# Patient Record
Sex: MALE | Race: WHITE | NOT HISPANIC OR LATINO | Employment: OTHER | URBAN - METROPOLITAN AREA
[De-identification: names, ages, dates, MRNs, and addresses within clinical notes are randomized per-mention and may not be internally consistent; named-entity substitution may affect disease eponyms.]

---

## 2019-01-13 ENCOUNTER — APPOINTMENT (OUTPATIENT)
Dept: RADIOLOGY | Facility: MEDICAL CENTER | Age: 84
End: 2019-01-13
Payer: MEDICARE

## 2019-01-13 ENCOUNTER — APPOINTMENT (OUTPATIENT)
Dept: RADIOLOGY | Facility: MEDICAL CENTER | Age: 84
End: 2019-01-13
Attending: EMERGENCY MEDICINE
Payer: MEDICARE

## 2019-01-13 ENCOUNTER — HOSPITAL ENCOUNTER (OUTPATIENT)
Facility: MEDICAL CENTER | Age: 84
End: 2019-01-14
Attending: EMERGENCY MEDICINE | Admitting: HOSPITALIST
Payer: MEDICARE

## 2019-01-13 DIAGNOSIS — S42.202A CLOSED FRACTURE OF PROXIMAL END OF LEFT HUMERUS, UNSPECIFIED FRACTURE MORPHOLOGY, INITIAL ENCOUNTER: ICD-10-CM

## 2019-01-13 DIAGNOSIS — S02.30XA CLOSED FRACTURE OF ORBITAL FLOOR, UNSPECIFIED LATERALITY, INITIAL ENCOUNTER (HCC): ICD-10-CM

## 2019-01-13 DIAGNOSIS — S09.90XA CLOSED HEAD INJURY, INITIAL ENCOUNTER: ICD-10-CM

## 2019-01-13 DIAGNOSIS — S01.81XA FACIAL LACERATION, INITIAL ENCOUNTER: ICD-10-CM

## 2019-01-13 PROBLEM — S42.202D CLOSED FRACTURE OF PROXIMAL END OF LEFT HUMERUS WITH ROUTINE HEALING: Status: ACTIVE | Noted: 2019-01-13

## 2019-01-13 PROBLEM — E66.3 OVERWEIGHT (BMI 25.0-29.9): Status: ACTIVE | Noted: 2019-01-13

## 2019-01-13 PROBLEM — E78.5 DYSLIPIDEMIA: Status: ACTIVE | Noted: 2019-01-13

## 2019-01-13 PROBLEM — S02.85XD CLOSED FRACTURE OF ORBIT WITH ROUTINE HEALING: Status: ACTIVE | Noted: 2019-01-13
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
## 2019-01-13 LAB
ABO GROUP BLD: NORMAL
ALBUMIN SERPL BCP-MCNC: 4.2 G/DL (ref 3.2–4.9)
ALBUMIN/GLOB SERPL: 1.3 G/DL
ALP SERPL-CCNC: 84 U/L (ref 30–99)
ALT SERPL-CCNC: 15 U/L (ref 2–50)
ANION GAP SERPL CALC-SCNC: 8 MMOL/L (ref 0–11.9)
APTT PPP: 28 SEC (ref 24.7–36)
AST SERPL-CCNC: 18 U/L (ref 12–45)
BILIRUB SERPL-MCNC: 0.5 MG/DL (ref 0.1–1.5)
BLD GP AB SCN SERPL QL: NORMAL
BUN SERPL-MCNC: 19 MG/DL (ref 8–22)
CALCIUM SERPL-MCNC: 9.4 MG/DL (ref 8.5–10.5)
CFT BLD TEG: 5.4 MIN (ref 5–10)
CHLORIDE SERPL-SCNC: 107 MMOL/L (ref 96–112)
CLOT ANGLE BLD TEG: 64.3 DEGREES (ref 53–72)
CLOT LYSIS 30M P MA LENFR BLD TEG: 0.9 % (ref 0–8)
CO2 SERPL-SCNC: 25 MMOL/L (ref 20–33)
CREAT SERPL-MCNC: 1.27 MG/DL (ref 0.5–1.4)
CT.EXTRINSIC BLD ROTEM: 1.8 MIN (ref 1–3)
ERYTHROCYTE [DISTWIDTH] IN BLOOD BY AUTOMATED COUNT: 42.6 FL (ref 35.9–50)
ETHANOL BLD-MCNC: 0 G/DL
GLOBULIN SER CALC-MCNC: 3.2 G/DL (ref 1.9–3.5)
GLUCOSE SERPL-MCNC: 165 MG/DL (ref 65–99)
HCT VFR BLD AUTO: 42.6 % (ref 42–52)
HGB BLD-MCNC: 14.6 G/DL (ref 14–18)
INR PPP: 0.99 (ref 0.87–1.13)
MCF BLD TEG: 61.6 MM (ref 50–70)
MCH RBC QN AUTO: 33.3 PG (ref 27–33)
MCHC RBC AUTO-ENTMCNC: 34.3 G/DL (ref 33.7–35.3)
MCV RBC AUTO: 97 FL (ref 81.4–97.8)
PA AA BLD-ACNC: 32 %
PA ADP BLD-ACNC: 0 %
PLATELET # BLD AUTO: 220 K/UL (ref 164–446)
PMV BLD AUTO: 10 FL (ref 9–12.9)
POTASSIUM SERPL-SCNC: 3.9 MMOL/L (ref 3.6–5.5)
PROT SERPL-MCNC: 7.4 G/DL (ref 6–8.2)
PROTHROMBIN TIME: 13.2 SEC (ref 12–14.6)
RBC # BLD AUTO: 4.39 M/UL (ref 4.7–6.1)
RH BLD: NORMAL
SODIUM SERPL-SCNC: 140 MMOL/L (ref 135–145)
TEG ALGORITHM TGALG: NORMAL
WBC # BLD AUTO: 11.8 K/UL (ref 4.8–10.8)

## 2019-01-13 PROCEDURE — 85730 THROMBOPLASTIN TIME PARTIAL: CPT

## 2019-01-13 PROCEDURE — 99220 PR INITIAL OBSERVATION CARE,LEVL III: CPT | Performed by: HOSPITALIST

## 2019-01-13 PROCEDURE — 85347 COAGULATION TIME ACTIVATED: CPT

## 2019-01-13 PROCEDURE — 86850 RBC ANTIBODY SCREEN: CPT

## 2019-01-13 PROCEDURE — 70450 CT HEAD/BRAIN W/O DYE: CPT

## 2019-01-13 PROCEDURE — 85610 PROTHROMBIN TIME: CPT

## 2019-01-13 PROCEDURE — 73070 X-RAY EXAM OF ELBOW: CPT | Mod: LT

## 2019-01-13 PROCEDURE — 303353 HCHG DERMABOND SKIN ADHESIVE

## 2019-01-13 PROCEDURE — 73030 X-RAY EXAM OF SHOULDER: CPT | Mod: LT

## 2019-01-13 PROCEDURE — 86900 BLOOD TYPING SEROLOGIC ABO: CPT

## 2019-01-13 PROCEDURE — 304999 HCHG REPAIR-SIMPLE/INTERMED LEVEL 1

## 2019-01-13 PROCEDURE — G0378 HOSPITAL OBSERVATION PER HR: HCPCS

## 2019-01-13 PROCEDURE — 304217 HCHG IRRIGATION SYSTEM

## 2019-01-13 PROCEDURE — 70486 CT MAXILLOFACIAL W/O DYE: CPT

## 2019-01-13 PROCEDURE — 85576 BLOOD PLATELET AGGREGATION: CPT | Mod: 91

## 2019-01-13 PROCEDURE — 71045 X-RAY EXAM CHEST 1 VIEW: CPT

## 2019-01-13 PROCEDURE — 72125 CT NECK SPINE W/O DYE: CPT

## 2019-01-13 PROCEDURE — 305948 HCHG GREEN TRAUMA ACT PRE-NOTIFY NO CC

## 2019-01-13 PROCEDURE — 700105 HCHG RX REV CODE 258: Performed by: HOSPITALIST

## 2019-01-13 PROCEDURE — 80307 DRUG TEST PRSMV CHEM ANLYZR: CPT

## 2019-01-13 PROCEDURE — 86901 BLOOD TYPING SEROLOGIC RH(D): CPT

## 2019-01-13 PROCEDURE — 80053 COMPREHEN METABOLIC PANEL: CPT

## 2019-01-13 PROCEDURE — 99285 EMERGENCY DEPT VISIT HI MDM: CPT

## 2019-01-13 PROCEDURE — 85384 FIBRINOGEN ACTIVITY: CPT

## 2019-01-13 PROCEDURE — 85027 COMPLETE CBC AUTOMATED: CPT

## 2019-01-13 RX ORDER — SODIUM CHLORIDE 9 MG/ML
INJECTION, SOLUTION INTRAVENOUS CONTINUOUS
Status: DISCONTINUED | OUTPATIENT
Start: 2019-01-13 | End: 2019-01-14 | Stop reason: HOSPADM

## 2019-01-13 RX ORDER — ACETAMINOPHEN 325 MG/1
650 TABLET ORAL EVERY 6 HOURS PRN
Status: DISCONTINUED | OUTPATIENT
Start: 2019-01-13 | End: 2019-01-14 | Stop reason: HOSPADM

## 2019-01-13 RX ORDER — MORPHINE SULFATE 4 MG/ML
2 INJECTION, SOLUTION INTRAMUSCULAR; INTRAVENOUS
Status: DISCONTINUED | OUTPATIENT
Start: 2019-01-13 | End: 2019-01-14 | Stop reason: HOSPADM

## 2019-01-13 RX ORDER — POLYETHYLENE GLYCOL 3350 17 G/17G
1 POWDER, FOR SOLUTION ORAL
Status: DISCONTINUED | OUTPATIENT
Start: 2019-01-13 | End: 2019-01-14 | Stop reason: HOSPADM

## 2019-01-13 RX ORDER — ATORVASTATIN CALCIUM 10 MG/1
10 TABLET, FILM COATED ORAL
COMMUNITY

## 2019-01-13 RX ORDER — AMOXICILLIN 250 MG
2 CAPSULE ORAL 2 TIMES DAILY
Status: DISCONTINUED | OUTPATIENT
Start: 2019-01-13 | End: 2019-01-14 | Stop reason: HOSPADM

## 2019-01-13 RX ORDER — ATORVASTATIN CALCIUM 10 MG/1
10 TABLET, FILM COATED ORAL
Status: DISCONTINUED | OUTPATIENT
Start: 2019-01-13 | End: 2019-01-14 | Stop reason: HOSPADM

## 2019-01-13 RX ORDER — ONDANSETRON 2 MG/ML
4 INJECTION INTRAMUSCULAR; INTRAVENOUS EVERY 4 HOURS PRN
Status: DISCONTINUED | OUTPATIENT
Start: 2019-01-13 | End: 2019-01-14 | Stop reason: HOSPADM

## 2019-01-13 RX ORDER — OXYCODONE HYDROCHLORIDE 5 MG/1
5 TABLET ORAL
Status: DISCONTINUED | OUTPATIENT
Start: 2019-01-13 | End: 2019-01-14 | Stop reason: HOSPADM

## 2019-01-13 RX ORDER — BISACODYL 10 MG
10 SUPPOSITORY, RECTAL RECTAL
Status: DISCONTINUED | OUTPATIENT
Start: 2019-01-13 | End: 2019-01-14 | Stop reason: HOSPADM

## 2019-01-13 RX ORDER — ONDANSETRON 4 MG/1
4 TABLET, ORALLY DISINTEGRATING ORAL EVERY 4 HOURS PRN
Status: DISCONTINUED | OUTPATIENT
Start: 2019-01-13 | End: 2019-01-14 | Stop reason: HOSPADM

## 2019-01-13 RX ORDER — OXYCODONE HYDROCHLORIDE 5 MG/1
2.5 TABLET ORAL
Status: DISCONTINUED | OUTPATIENT
Start: 2019-01-13 | End: 2019-01-14 | Stop reason: HOSPADM

## 2019-01-13 RX ADMIN — SODIUM CHLORIDE: 9 INJECTION, SOLUTION INTRAVENOUS at 22:34

## 2019-01-13 ASSESSMENT — LIFESTYLE VARIABLES
HOW MANY TIMES IN THE PAST YEAR HAVE YOU HAD 5 OR MORE DRINKS IN A DAY: 0
EVER FELT BAD OR GUILTY ABOUT YOUR DRINKING: NO
ALCOHOL_USE: YES
HAVE PEOPLE ANNOYED YOU BY CRITICIZING YOUR DRINKING: NO
CONSUMPTION TOTAL: NEGATIVE
ON A TYPICAL DAY WHEN YOU DRINK ALCOHOL HOW MANY DRINKS DO YOU HAVE: 0
AVERAGE NUMBER OF DAYS PER WEEK YOU HAVE A DRINK CONTAINING ALCOHOL: 0
TOTAL SCORE: 0
TOTAL SCORE: 0
HAVE YOU EVER FELT YOU SHOULD CUT DOWN ON YOUR DRINKING: NO
EVER_SMOKED: NEVER
EVER HAD A DRINK FIRST THING IN THE MORNING TO STEADY YOUR NERVES TO GET RID OF A HANGOVER: NO
TOTAL SCORE: 0

## 2019-01-13 ASSESSMENT — ENCOUNTER SYMPTOMS
EYES NEGATIVE: 1
CARDIOVASCULAR NEGATIVE: 1
RESPIRATORY NEGATIVE: 1
FALLS: 1
CONSTITUTIONAL NEGATIVE: 1
GASTROINTESTINAL NEGATIVE: 1
PSYCHIATRIC NEGATIVE: 1
NEUROLOGICAL NEGATIVE: 1

## 2019-01-13 ASSESSMENT — PATIENT HEALTH QUESTIONNAIRE - PHQ9
SUM OF ALL RESPONSES TO PHQ9 QUESTIONS 1 AND 2: 0
1. LITTLE INTEREST OR PLEASURE IN DOING THINGS: NOT AT ALL
2. FEELING DOWN, DEPRESSED, IRRITABLE, OR HOPELESS: NOT AT ALL

## 2019-01-13 ASSESSMENT — COPD QUESTIONNAIRES
DURING THE PAST 4 WEEKS HOW MUCH DID YOU FEEL SHORT OF BREATH: NONE/LITTLE OF THE TIME
HAVE YOU SMOKED AT LEAST 100 CIGARETTES IN YOUR ENTIRE LIFE: NO/DON'T KNOW
COPD SCREENING SCORE: 2
DO YOU EVER COUGH UP ANY MUCUS OR PHLEGM?: NO/ONLY WITH OCCASIONAL COLDS OR INFECTIONS
IN THE PAST 12 MONTHS DO YOU DO LESS THAN YOU USED TO BECAUSE OF YOUR BREATHING PROBLEMS: DISAGREE/UNSURE

## 2019-01-13 ASSESSMENT — PAIN SCALES - GENERAL
PAINLEVEL_OUTOF10: 2
PAINLEVEL_OUTOF10: 1

## 2019-01-14 ENCOUNTER — PATIENT OUTREACH (OUTPATIENT)
Dept: HEALTH INFORMATION MANAGEMENT | Facility: OTHER | Age: 84
End: 2019-01-14

## 2019-01-14 VITALS
SYSTOLIC BLOOD PRESSURE: 137 MMHG | OXYGEN SATURATION: 96 % | BODY MASS INDEX: 25.44 KG/M2 | DIASTOLIC BLOOD PRESSURE: 65 MMHG | RESPIRATION RATE: 18 BRPM | HEIGHT: 66 IN | WEIGHT: 158.29 LBS | HEART RATE: 84 BPM | TEMPERATURE: 98.6 F

## 2019-01-14 LAB
ABO GROUP BLD: NORMAL
ANION GAP SERPL CALC-SCNC: 10 MMOL/L (ref 0–11.9)
BASOPHILS # BLD AUTO: 0.3 % (ref 0–1.8)
BASOPHILS # BLD: 0.03 K/UL (ref 0–0.12)
BUN SERPL-MCNC: 21 MG/DL (ref 8–22)
CALCIUM SERPL-MCNC: 9.1 MG/DL (ref 8.5–10.5)
CHLORIDE SERPL-SCNC: 108 MMOL/L (ref 96–112)
CO2 SERPL-SCNC: 23 MMOL/L (ref 20–33)
CREAT SERPL-MCNC: 1.12 MG/DL (ref 0.5–1.4)
EOSINOPHIL # BLD AUTO: 0.01 K/UL (ref 0–0.51)
EOSINOPHIL NFR BLD: 0.1 % (ref 0–6.9)
ERYTHROCYTE [DISTWIDTH] IN BLOOD BY AUTOMATED COUNT: 43.9 FL (ref 35.9–50)
GLUCOSE SERPL-MCNC: 123 MG/DL (ref 65–99)
HCT VFR BLD AUTO: 38.3 % (ref 42–52)
HGB BLD-MCNC: 12.7 G/DL (ref 14–18)
IMM GRANULOCYTES # BLD AUTO: 0.03 K/UL (ref 0–0.11)
IMM GRANULOCYTES NFR BLD AUTO: 0.3 % (ref 0–0.9)
LYMPHOCYTES # BLD AUTO: 1.23 K/UL (ref 1–4.8)
LYMPHOCYTES NFR BLD: 14.1 % (ref 22–41)
MCH RBC QN AUTO: 33.2 PG (ref 27–33)
MCHC RBC AUTO-ENTMCNC: 33.2 G/DL (ref 33.7–35.3)
MCV RBC AUTO: 100 FL (ref 81.4–97.8)
MONOCYTES # BLD AUTO: 0.9 K/UL (ref 0–0.85)
MONOCYTES NFR BLD AUTO: 10.3 % (ref 0–13.4)
NEUTROPHILS # BLD AUTO: 6.54 K/UL (ref 1.82–7.42)
NEUTROPHILS NFR BLD: 74.9 % (ref 44–72)
NRBC # BLD AUTO: 0 K/UL
NRBC BLD-RTO: 0 /100 WBC
PLATELET # BLD AUTO: 205 K/UL (ref 164–446)
PMV BLD AUTO: 10.3 FL (ref 9–12.9)
POTASSIUM SERPL-SCNC: 4 MMOL/L (ref 3.6–5.5)
RBC # BLD AUTO: 3.83 M/UL (ref 4.7–6.1)
RH BLD: NORMAL
SODIUM SERPL-SCNC: 141 MMOL/L (ref 135–145)
WBC # BLD AUTO: 8.7 K/UL (ref 4.8–10.8)

## 2019-01-14 PROCEDURE — 700111 HCHG RX REV CODE 636 W/ 250 OVERRIDE (IP): Performed by: HOSPITALIST

## 2019-01-14 PROCEDURE — A9270 NON-COVERED ITEM OR SERVICE: HCPCS | Performed by: HOSPITALIST

## 2019-01-14 PROCEDURE — G0378 HOSPITAL OBSERVATION PER HR: HCPCS

## 2019-01-14 PROCEDURE — 85025 COMPLETE CBC W/AUTO DIFF WBC: CPT

## 2019-01-14 PROCEDURE — 99217 PR OBSERVATION CARE DISCHARGE: CPT | Performed by: INTERNAL MEDICINE

## 2019-01-14 PROCEDURE — 80048 BASIC METABOLIC PNL TOTAL CA: CPT

## 2019-01-14 PROCEDURE — 97161 PT EVAL LOW COMPLEX 20 MIN: CPT

## 2019-01-14 PROCEDURE — 700102 HCHG RX REV CODE 250 W/ 637 OVERRIDE(OP): Performed by: HOSPITALIST

## 2019-01-14 PROCEDURE — 96372 THER/PROPH/DIAG INJ SC/IM: CPT

## 2019-01-14 PROCEDURE — 97165 OT EVAL LOW COMPLEX 30 MIN: CPT

## 2019-01-14 RX ORDER — OXYCODONE HYDROCHLORIDE 5 MG/1
2.5-5 TABLET ORAL EVERY 6 HOURS PRN
Qty: 20 TAB | Refills: 0 | Status: SHIPPED | OUTPATIENT
Start: 2019-01-14 | End: 2019-01-19

## 2019-01-14 RX ADMIN — ENOXAPARIN SODIUM 40 MG: 100 INJECTION SUBCUTANEOUS at 06:50

## 2019-01-14 RX ADMIN — ATORVASTATIN CALCIUM 10 MG: 10 TABLET, FILM COATED ORAL at 00:43

## 2019-01-14 RX ADMIN — ASPIRIN 81 MG: 81 TABLET, COATED ORAL at 00:43

## 2019-01-14 RX ADMIN — ACETAMINOPHEN 650 MG: 325 TABLET, FILM COATED ORAL at 04:23

## 2019-01-14 ASSESSMENT — COGNITIVE AND FUNCTIONAL STATUS - GENERAL
MOVING TO AND FROM BED TO CHAIR: A LITTLE
CLIMB 3 TO 5 STEPS WITH RAILING: A LITTLE
SUGGESTED CMS G CODE MODIFIER MOBILITY: CJ
DRESSING REGULAR UPPER BODY CLOTHING: A LITTLE
HELP NEEDED FOR BATHING: A LOT
MOBILITY SCORE: 21
TOILETING: A LITTLE
TURNING FROM BACK TO SIDE WHILE IN FLAT BAD: A LITTLE
DRESSING REGULAR LOWER BODY CLOTHING: A LOT
DAILY ACTIVITIY SCORE: 18
SUGGESTED CMS G CODE MODIFIER DAILY ACTIVITY: CK

## 2019-01-14 ASSESSMENT — PAIN SCALES - GENERAL
PAINLEVEL_OUTOF10: 4
PAINLEVEL_OUTOF10: 0
PAINLEVEL_OUTOF10: 0

## 2019-01-14 ASSESSMENT — GAIT ASSESSMENTS
GAIT LEVEL OF ASSIST: STAND BY ASSIST
DISTANCE (FEET): 150
DEVIATION: DECREASED BASE OF SUPPORT;BRADYKINETIC;OTHER (COMMENT)

## 2019-01-14 ASSESSMENT — ACTIVITIES OF DAILY LIVING (ADL): TOILETING: INDEPENDENT

## 2019-01-14 NOTE — H&P
Hospital Medicine History & Physical Note    Date of Service  1/13/2019    Primary Care Physician  No primary care provider on file.    Consultants  Trauma Spoer  Orthopedics [facility]  Plastic Surgery [facility]    Code Status  Full code     Chief Complaint  Left shoulder pain    History of Presenting Illness  86 y.o. male with prior reported history of dyslipidemia but with no other medical history was in his usual state of health until the day of admission.  He was attempting to clean out his chimney, and had to get on a ladder to do so.  He remembers placing the cleaning device into the chimney, and then falling from the ladder, landing on his left side.  He reports severe pain in his left shoulder, inability to move his arm due to the pain.  With no alleviating factors.  He subsequently was brought to the emergency department and was a trauma activation.  He continues to have severe pain.  He denies any chest pain or shortness of breath, no abdominal pain, nausea vomiting, diarrhea or constipation.    Review of Systems  Review of Systems   Constitutional: Negative.    HENT: Negative.    Eyes: Negative.    Respiratory: Negative.    Cardiovascular: Negative.    Gastrointestinal: Negative.    Genitourinary: Negative.    Musculoskeletal: Positive for falls and joint pain.   Skin: Negative.    Neurological: Negative.    Endo/Heme/Allergies: Negative.    Psychiatric/Behavioral: Negative.        Past Medical History   has a past medical history of Dyslipidemia.    Surgical History   has no past surgical history on file.     Family History  family history includes No Known Problems in his mother; Stroke in his father.     Social History   reports that he has never smoked. He has never used smokeless tobacco. He reports that he does not drink alcohol or use drugs.    Allergies  No Known Allergies    Medications  Prior to Admission Medications   Prescriptions Last Dose Informant Patient Reported? Taking?   aspirin EC (ECOTRIN)  81 MG Tablet Delayed Response 1/12/2019 at 2100 Patient Yes Yes   Sig: Take 81 mg by mouth every bedtime.   atorvastatin (LIPITOR) 10 MG Tab 1/12/2019 at 2100 Patient Yes Yes   Sig: Take 10 mg by mouth every bedtime.      Facility-Administered Medications: None       Physical Exam  Temp:  [36.2 °C (97.2 °F)] 36.2 °C (97.2 °F)  Pulse:  [80-88] 81  Resp:  [15-30] 30  BP: (136-159)/(61-88) 159/61    Physical Exam   Constitutional: He is oriented to person, place, and time. He appears well-developed and well-nourished. No distress.   HENT:   Head: Normocephalic.   Ecchymosis and swelling around left eye.    Eyes: Pupils are equal, round, and reactive to light. Conjunctivae are normal.   Neck: Normal range of motion. Neck supple. No tracheal deviation present. No thyromegaly present.   Cardiovascular: Normal rate, regular rhythm and normal heart sounds.  Exam reveals no gallop and no friction rub.    No murmur heard.  Pulmonary/Chest: Effort normal and breath sounds normal. No respiratory distress. He has no wheezes.   Abdominal: Soft. Bowel sounds are normal. He exhibits no distension and no mass. There is no tenderness. There is no rebound and no guarding.   Musculoskeletal: Normal range of motion. He exhibits no edema.   Lymphadenopathy:     He has no cervical adenopathy.   Neurological: He is alert and oriented to person, place, and time. No cranial nerve deficit.   Skin: Skin is warm and dry. He is not diaphoretic.   Psychiatric: He has a normal mood and affect.   Nursing note and vitals reviewed.      Laboratory:  Recent Labs      01/13/19   1700   WBC  11.8*   RBC  4.39*   HEMOGLOBIN  14.6   HEMATOCRIT  42.6   MCV  97.0   MCH  33.3*   MCHC  34.3   RDW  42.6   PLATELETCT  220   MPV  10.0     Recent Labs      01/13/19   1700   SODIUM  140   POTASSIUM  3.9   CHLORIDE  107   CO2  25   GLUCOSE  165*   BUN  19   CREATININE  1.27   CALCIUM  9.4     Recent Labs      01/13/19   1700   ALTSGPT  15   ASTSGOT  18    ALKPHOSPHAT  84   TBILIRUBIN  0.5   GLUCOSE  165*     Recent Labs      01/13/19   1700   APTT  28.0   INR  0.99             No results for input(s): TROPONINI in the last 72 hours.    Urinalysis:    No results found     Imaging:  CT-MAXILLOFACIAL W/O PLUS RECONS   Final Result      Depressed left orbital floor fracture.      CT-CSPINE WITHOUT PLUS RECONS   Final Result      Degenerative change without evidence of cervical spine fracture.      CT-HEAD W/O   Final Result      1.  Cerebral atrophy.      2.  White matter lucencies most consistent with small vessel ischemic change versus demyelination or gliosis.      3.  No evidence of acute cerebral infarction, hemorrhage or mass lesion.      DX-ELBOW-LIMITED 2- LEFT   Final Result      No displaced fractures. No elbow effusion.      DX-SHOULDER 2+ LEFT   Final Result      Left humeral head fracture involving the greater tuberosity.                  INTERPRETING LOCATION:  [address], [place], [address]      DX-CHEST-LIMITED (1 VIEW)   Final Result         No acute cardiac or pulmonary abnormality is identified.      Left humeral head fracture.            Assessment/Plan:  I anticipate this patient is appropriate for observation status at this time.    * Closed fracture of proximal end of left humerus with routine healing   Assessment & Plan    In sling as per orthopedics.  Will follow with Dr. Neumann as outpatient      Overweight (BMI 25.0-29.9)   Assessment & Plan    Body mass index is 25.82 kg/m².       Fall from ladder   Assessment & Plan    Stable.      Closed fracture of orbit with routine healing   Assessment & Plan    Plan for outpatient follow-up with plastic surgery.      Dyslipidemia   Assessment & Plan    Not currently on therapy.  Outpatient follow-up         VTE prophylaxis: SCD, lovenox

## 2019-01-14 NOTE — CONSULTS
DATE OF SERVICE:  01/14/2019    REQUESTING PHYSICIAN:  Tian James MD    CHIEF COMPLAINT:  Left shoulder pain.    HISTORY OF PRESENT ILLNESS:  The patient is 86 years old, fell about 6 feet   off a ladder and injured his left shoulder, was seen in the emergency room,   found to have a proximal humerus fracture, and admitted to the hospital.    Orthopedic consultation has been requested.    ALLERGIES:  None.    MEDICATIONS:  Aspirin and Lipitor.    PAST MEDICAL HISTORY:  Hyperlipidemia.    PAST SURGICAL HISTORY:  None.    SOCIAL HISTORY:  The patient does not smoke, drink or use any drugs.  He lives   in [place].    FAMILY HISTORY:  Positive for stroke in his father.    REVIEW OF SYSTEMS:  No loss of conscious, nausea, vomiting, diarrhea,   constipation, polyuria, dysuria, fevers, chills, weight loss, weight gain,   abdominal pain, chest pain or shortness of breath.    PHYSICAL EXAMINATION:  GENERAL:  The patient is in no acute distress.  VITAL SIGNS:  His blood pressure is 147/68, heart rate 83, respirations 16,   temperature 98.7.  HEENT:  Normocephalic, atraumatic.  NECK:  Supple, nontender.  CHEST AND ABDOMEN:  Nontender.  No labored breathing.  EXTREMITIES:  Right upper and bilateral lower extremities without tenderness   or deformity.  Left upper extremity is in a sling.  There is mild swelling   over the shoulder.  There is no significant bruising.  Skin is intact.  He   fires EPL, FPL, and interossei, has intact light touch sensation in median,   ulnar and radial distribution.    LABORATORY DATA:  Include white blood cell count of 8700, hematocrit 38.3%,   platelet count 205,000.  Sodium 141, potassium 4.0, creatinine 1.12.    DIAGNOSTIC DATA:  X-rays of the left shoulder show a minimally displaced   proximal humerus fracture.    Radiographs of the left elbow show some degenerative changes.  No acute   fracture.    CT scan of the head demonstrates no fractures or intracranial bleeding.  CT   scan of the face  demonstrates a left orbital floor fracture.    ASSESSMENT:  Left proximal humerus fracture.    PLAN:  I recommended nonoperative treatment.  He can be in a sling.  He can   take this off as well as for comfort.  We will see him back in the clinic in   approximately 1 week for repeat radiographs at which time we will also him on   some early therapy exercises.       ____________________________________     MD RADHA TOBAR / KARMEN    DD:  01/14/2019 13:17:26  DT:  01/14/2019 13:26:41    D#:  [id number]  Job#:  [id number]

## 2019-01-14 NOTE — DISCHARGE SUMMARY
Discharge Summary    CHIEF COMPLAINT ON ADMISSION  No chief complaint on file.      Reason for Admission  trauma green     Admission Date  1/13/2019  Discharge Date  01/14/19    CODE STATUS  Full Code    HPI & HOSPITAL COURSE  This is a 86 y.o. Male with PMH dyslipidemia here with fall from ladder.  He was attempting to clean out his chimney which required a ladder which he fell off of, landing onto his left side.  He thinks he was on the second to last or last step when he fell.  X-ray showed left humeral head fracture involving the greater tuberosity.  CT showed depressed left orbital floor fracture acute hemorrhage.  CT C-spine showed no fractures.  He was evaluated by orthopedics who recommended nonsurgical management at this time.  He was fitted for a sling and is to be nonweightbearing until seen by Dr. Neumann in the office in 1 week.  He is agreeable to the plan of care and very eager for DC this morning.    In prescribing controlled substances to this patient, I certify that I have obtained and reviewed the medical history of Luciano Bell. I have also made a good nena effort to obtain applicable records from other providers who have treated the patient and records did not demonstrate any increased risk of substance abuse that would prevent me from prescribing controlled substances.     I have conducted a physical exam and documented it. I have reviewed Mr. Bell’s prescription history as maintained by the [place] Prescription Monitoring Program.     I have assessed the patient’s risk for abuse, dependency, and addiction using the validated Opioid Risk Tool available at https://www.mdcalc.com/frgbbw-uapy-wdhb-ort-narcotic-abuse.     Given the above, I believe the benefits of controlled substance therapy outweigh the risks. The reasons for prescribing controlled substances include non-narcotic, oral analgesic alternatives have been inadequate for pain control. Accordingly, I have discussed the risk and  benefits, treatment plan, and alternative therapies with the patient.     Therefore, he is discharged in good and stable condition to home with close outpatient follow-up.      FOLLOW UP ITEMS POST DISCHARGE  -Take narcotics only as prescribed for you. Do not share narcotics.  Dispose of them as instructed by pharmacist.  -FU with PCP  -FU with Orthopedics  -Return to the nearest ED or call 911 for worsening symptoms.     DISCHARGE DIAGNOSES  Principal Problem:    Closed fracture of proximal end of left humerus with routine healing POA: Yes  Active Problems:    Dyslipidemia POA: Yes    Closed fracture of orbit with routine healing POA: Yes    Fall from ladder POA: Yes    Overweight (BMI 25.0-29.9) POA: Yes  Resolved Problems:    * No resolved hospital problems. *    FOLLOW UP  Sukhjinder Barcenas D.O.  [address] [address]  [facility] [address] [phone]    Schedule an appointment as soon as possible for a visit in 1 week      Paul Neumann M.D.  [address] [facility] [address]  [phone]      [facility]  left a message for Dr Cardenas office regarding need for follow up appointment. Please call if you do not hear from them with in 2 days. Thank you       MEDICATIONS ON DISCHARGE     Medication List      START taking these medications      Instructions   oxyCODONE immediate-release 5 MG Tabs  Commonly known as:  ROXICODONE   Take 0.5-1 Tabs by mouth every 6 hours as needed for Severe Pain for up to 5 days.  Dose:  2.5-5 mg        CONTINUE taking these medications      Instructions   aspirin EC 81 MG Tbec  Commonly known as:  ECOTRIN   Take 81 mg by mouth every bedtime.  Dose:  81 mg     atorvastatin 10 MG Tabs  Commonly known as:  LIPITOR   Take 10 mg by mouth every bedtime.  Dose:  10 mg            Allergies  No Known Allergies    DIET  Orders Placed This Encounter   Procedures   • Diet Order Regular     Standing Status:   Standing     Number of Occurrences:   1     Order Specific Question:    Diet:     Answer:   Regular [1]       ACTIVITY  As tolerated.  Non-weight bearing LUE    CONSULTATIONS  Orthopedics    PROCEDURES  NA    LABORATORY  Lab Results   Component Value Date    SODIUM 141 01/14/2019    POTASSIUM 4.0 01/14/2019    CHLORIDE 108 01/14/2019    CO2 23 01/14/2019    GLUCOSE 123 (H) 01/14/2019    BUN 21 01/14/2019    CREATININE 1.12 01/14/2019        Lab Results   Component Value Date    WBC 8.7 01/14/2019    HEMOGLOBIN 12.7 (L) 01/14/2019    HEMATOCRIT 38.3 (L) 01/14/2019    PLATELETCT 205 01/14/2019        JENNI Mora.

## 2019-01-14 NOTE — PROGRESS NOTES
Pt admitted to room T213 from [facility] 13 at 2216.  Pt  a&o x4, [place]. generalized pain reported at 2 on a scale of 0-10. Refusing pain med needs. Educated regarding importance of pain management to be able to turn and do ADL's. Verbalized understanding.  Ambulating with 1-2 person sba. Left shoulder fracture with sling. Wiggling fingers. Denies numbness or tingling.  On 2L o2 nc. Respirations shallow and unlabored. Oriented to room call light and vitals frequency. Reviewed plan of care: PT/OT diet, fall precautions, skin care, labs,and pain management with patient and family. Admit profile done. Passed RN bedside swallow evaluation without s/sx of aspiration. All questions answered. Verbalized understanding and agrees. Able to make needs known.

## 2019-01-14 NOTE — CARE PLAN
Problem: Safety  Goal: Will remain free from injury  Outcome: PROGRESSING AS EXPECTED  Pt ambulated with one person assist. Unsteady. Instructed to use call light prior to getting oob. Fall precautions in place.     Problem: Pain Management  Goal: Pain level will decrease to patient's comfort goal  Outcome: PROGRESSING AS EXPECTED  Complaints of generalized soreness but refused any pain med needs. Resting right now.     Problem: Infection  Goal: Will remain free from infection  Outcome: PROGRESSING AS EXPECTED  Afebrile. Denies chills. No s/sx of infection noted.

## 2019-01-14 NOTE — THERAPY
"Physical Therapy Evaluation completed.   Bed Mobility:  Supine to Sit: Stand by Assist  Transfers: Sit to Stand: Stand by Assist  Gait: Level Of Assist: Stand by Assist with No Equipment Needed       Plan of Care: Patient with no further skilled PT needs in the acute care setting at this time  Discharge Recommendations: Equipment: No Equipment Needed. Post-acute therapy Discharge to home with outpatient or home health for additional skilled therapy services.    See \"Rehab Therapy-Acute\" Patient Summary Report for complete documentation.     Pt is a 86 y.o. male who sustained a L humerus fx, L orbital floor fx, L side facial lacerations, and closed head injury after falling from a ladder. Pt would like to go home today, and reports he is near baseline function. Prior to the fall Pt was I in ADL's and used no AD. He lives with his wife in a single story home with 1 step to get in/out. He has a walk in shower with a shower chair. Pt ambulated in hallway with SBA with no AD. At time of initial evaluation, Pt did require verbal cues to perform rolling to R during supine to sit. Gait deviations included decreased XIOMARA and mild lateral trunk sway, though Pt generally steady and reports he is very close to baseline walking. No AD was used, and he did not demo stated need for AD. However, he does have SPC at home and was educated to use SPC vs FWW if needed to follow NWB precaution on L UE. Pt was educated on basic ROM on L elbow and wrist, UE elevation and ice to decrease inflammation. Pt does not demonstrate need for further skilled PT services while in acute care setting. Once DC, he will benefit from outpatient PT services after follow up with ortho.     "

## 2019-01-14 NOTE — ED PROVIDER NOTES
"ED Provider Note    CHIEF COMPLAINT  No chief complaint on file.      HPI  Francisco Mckeon is a 86 y.o. male here for evaluation after fall.  Patient was up on a ladder of approximately 5-7 feet, when he fell down after losing his .  Patient sustained a head injury, and left shoulder pain.  He states that he \"may have passed out for a second\" but does not recall the event for EMS.  The patient claims of left shoulder pain and some left elbow pain, he denies any anticoagulant use.  He has no fever and no vomiting.  He has no chest pain, no shortness of breath, he has no abdominal pain.  He has no back pain.    PAST MEDICAL HISTORY   No bleeding disorders    SOCIAL HISTORY  Social History     Social History Main Topics   • Smoking status: Not on file   • Smokeless tobacco: Not on file   • Alcohol use Not on file   • Drug use: Unknown   • Sexual activity: Not on file       No bleeding to orders    SURGICAL HISTORY  patient denies any surgical history    CURRENT MEDICATIONS  Home Medications    **Home medications have not yet been reviewed for this encounter**         ALLERGIES  No Known Allergies    REVIEW OF SYSTEMS  See HPI for further details. Review of systems as above, otherwise all other systems are negative.     PHYSICAL EXAM  Constitutional: Well developed, well nourished.  Mild acute distress.  HEENT: Normocephalic, left periorbital ecchymosis, left upper lateral eyelid with 1 cm linear superficial laceration.  1cm linear laceration to the chin.  No septal hematoma posterior pharynx clear and moist.  Eyes:  EOMI. Normal sclera.  Neck: Supple, Full range of motion, nontender midline.  Chest/Pulmonary: clear to ausculation. Symmetrical expansion.   Cardio: Regular rate and rhythm with no murmur.   Abdomen: Soft, nontender. No peritoneal signs. No guarding. No palpable masses.  Back: No CVA tenderness, nontender midline, no step offs.  Musculoskeletal: Tenderness to the left lateral shoulder, tenderness to " the left olecranon, nontender left wrist.  No edema, neurovascular intact distally.  Neuro: Clear speech, appropriate, cooperative, cranial nerves II-XII grossly intact.  Psych: Normal mood and affect    Results for orders placed or performed during the hospital encounter of 01/13/19   DIAGNOSTIC ALCOHOL   Result Value Ref Range    Diagnostic Alcohol 0.00 0.00 g/dL   CBC WITHOUT DIFFERENTIAL   Result Value Ref Range    WBC 11.8 (H) 4.8 - 10.8 K/uL    RBC 4.39 (L) 4.70 - 6.10 M/uL    Hemoglobin 14.6 14.0 - 18.0 g/dL    Hematocrit 42.6 42.0 - 52.0 %    MCV 97.0 81.4 - 97.8 fL    MCH 33.3 (H) 27.0 - 33.0 pg    MCHC 34.3 33.7 - 35.3 g/dL    RDW 42.6 35.9 - 50.0 fL    Platelet Count 220 164 - 446 K/uL    MPV 10.0 9.0 - 12.9 fL   COMP METABOLIC PANEL   Result Value Ref Range    Sodium 140 135 - 145 mmol/L    Potassium 3.9 3.6 - 5.5 mmol/L    Chloride 107 96 - 112 mmol/L    Co2 25 20 - 33 mmol/L    Anion Gap 8.0 0.0 - 11.9    Glucose 165 (H) 65 - 99 mg/dL    Bun 19 8 - 22 mg/dL    Creatinine 1.27 0.50 - 1.40 mg/dL    Calcium 9.4 8.5 - 10.5 mg/dL    AST(SGOT) 18 12 - 45 U/L    ALT(SGPT) 15 2 - 50 U/L    Alkaline Phosphatase 84 30 - 99 U/L    Total Bilirubin 0.5 0.1 - 1.5 mg/dL    Albumin 4.2 3.2 - 4.9 g/dL    Total Protein 7.4 6.0 - 8.2 g/dL    Globulin 3.2 1.9 - 3.5 g/dL    A-G Ratio 1.3 g/dL   Prothrombin Time   Result Value Ref Range    PT 13.2 12.0 - 14.6 sec    INR 0.99 0.87 - 1.13   APTT   Result Value Ref Range    APTT 28.0 24.7 - 36.0 sec   PLATELET MAPPING WITH BASIC TEG   Result Value Ref Range    Reaction Time Initial-R 5.4 5.0 - 10.0 min    Clot Kinetics-K 1.8 1.0 - 3.0 min    Clot Angle-Angle 64.3 53.0 - 72.0 degrees    Maximum Clot Strength-MA 61.6 50.0 - 70.0 mm    Lysis 30 minutes-LY30 0.9 0.0 - 8.0 %    % Inhibition ADP 0.0 %    % Inhibition AA 32.0 %    TEG Algorithm Link Algorithm    COD - Adult (Type and Screen)   Result Value Ref Range    ABO Grouping Only O     Rh Grouping Only NEG     Antibody  Screen-Cod NEG    ESTIMATED GFR   Result Value Ref Range    GFR If African American >60 >60 mL/min/1.73 m 2    GFR If Non African American 54 (A) >60 mL/min/1.73 m 2     CT-MAXILLOFACIAL W/O PLUS RECONS   Final Result      Depressed left orbital floor fracture.      CT-CSPINE WITHOUT PLUS RECONS   Final Result      Degenerative change without evidence of cervical spine fracture.      CT-HEAD W/O   Final Result      1.  Cerebral atrophy.      2.  White matter lucencies most consistent with small vessel ischemic change versus demyelination or gliosis.      3.  No evidence of acute cerebral infarction, hemorrhage or mass lesion.      DX-ELBOW-LIMITED 2- LEFT   Final Result      No displaced fractures. No elbow effusion.      DX-SHOULDER 2+ LEFT   Final Result      Left humeral head fracture involving the greater tuberosity.                  INTERPRETING LOCATION:  [facility], [address]      DX-CHEST-LIMITED (1 VIEW)   Final Result         No acute cardiac or pulmonary abnormality is identified.      Left humeral head fracture.            PROCEDURES   LACERATION REPAIR PROCEDURE NOTE  Laceration Repair Procedure    Indication: Laceration    Location/Description:  Left upper eyelid, 1cm, linear     Procedure: The patient was placed in the appropriate position and no anesthesia was needed.  The area was then irrigated.   The laceration was closed with Dermabond. There was an additional chin laceration of 1cm, requiring repair with dermabond as well.  The wound area was then dressed with a sterile dressing.      Total repaired wound length: 2 cm.     Other Items: None    The patient tolerated the procedure well.    Complications: None    MEDICAL RECORD  I have reviewed patient's medical record and pertinent results are listed above.    COURSE & MEDICAL DECISION MAKING  I have reviewed any medical record information, laboratory studies and radiographic results as noted above.    6:38 PM  The pt is having a lot of  pain and dizziness with sitting up.  He does not think he can tolerate going home, and the pts wife agrees.   We will sling the pt here, and he will be admitted for pain control and gentle iv fluids.   I spoke to Dr. Hurd, who states he can see the pt as an outpatient, and there is nothing to currently do for the orbital floor fracture.     6:40 PM  I spoke to Dr. Solano, who states the pt can be admitted to the hospitalist service, on the floor for pain control.  No trauma consult needed.     6:57 PM  The sling was placed, on the left upper extremity.     7:21 PM  Dr. James will admit the pt for pain control.     Justin PA on with Rodrick, spoke to Rodrick regarding the left humeral fracture. He states to place a sling, and he will see in follow up, as outpatient.     FINAL IMPRESSION  Closed head injury  Laceration, upper left eye lid  Chin laceration   Left orbital floor fracture   Humerus fracture       Electronically signed by: Nathan Chilel, 1/13/2019 5:57 PM

## 2019-01-14 NOTE — THERAPY
"Occupational Therapy Evaluation completed.   Functional Status: Supine>sit with SBA, req v/cs for WB through LUE. Educated on don/doff of sling; wife and pt verbalized understanding. Educated on continued use of LUE while adhering to WB status. Req max A for don/doff of socks, but reports wife able to assist PRN, as is home 24/7, and also has a sock aid. Sit>stand with SBA, but refused grooming. Ambulated OOR to BR w/o AD and SBA, then completed standing toileting with SBA, Continued education on NWB w/ LUE during ADLs with wife present; both verbalized understanding. Pt returned to supine with SPV.   Plan of Care: Patient with no further skilled OT needs in the acute care setting at this time  Discharge Recommendations:  Equipment: No Equipment Needed. Post-acute therapy: Recommend outpatient transitional care services for continued occupational therapy services after WB status has been upgraded.      See \"Rehab Therapy-Acute\" Patient Summary Report for complete documentation.      Pt is an 85 yo male admitted after fall from ladder resulting in L humerus fx, L orbital fx, and closed head injury. Pt req max A for ADLs, but reports wife able to provide assistance 24/7 PRN. Pt educated on sling and adherence to WB status during ADLs. Recommend outpatient transitional care services for continued occupational therapy services after WB status has been upgraded.    "

## 2019-01-14 NOTE — PROGRESS NOTES
Pt dc'd home. IV and monitor removed; Pt left unit via wheelchair with Family. Opioid consent form signed; Personal belongings with pt when leaving unit. Pt given discharge instructions prior to leaving unit including prescription and when to visit with physician; verbalizes understanding. Copy of discharge instructions with pt and in the chart.

## 2019-01-14 NOTE — PROGRESS NOTES
I have personally seen and examined / evaluated the patient, discussed the patient's evaluation & management plan with LANETTE García and I have reviewed the note below.     I agree with the findings, history, examination and assessment / plan as listed below with changes/addendum as listed below by me in my addendum / attestation separetely.     Fall from ladder   Left humeral head fracture  Left orbital floor fracture   Negative imaging otherwise     Non operative from ortho perspective   No trauma concerns from trauma perspective per ERP note  Plastics / Dr Hurd to evaluate patient outpatient     Patient wants to discharge home     Ok from orthopedics perspective     Obtain PT/OT evaluation - if okay to discharge from PT/OT perspective discharge home with close outpatient PCP, Orthopedics and Plastic surgery (Dr Hurd). Consider C or outpatient PT/OT based on PT/OT evaluation.     Pepe Drake M.D.  01/14/19  8:28 AM

## 2019-01-14 NOTE — ED NOTES
[facility] Care Flight to [facility], pt was on a 6 ft ladder and fell landing on his face.  Pt c/o of R shoulder pain, no deformity noted.  Pt has bruising and swelling around L eye with a small 1cm lac to L upper eye lid.  Pt is AOx4, VSS.

## 2019-01-14 NOTE — PROGRESS NOTES
Bedside report received 0710. POC discussed with pt and wife; eager to be discharged; Pt denies pain; CMS intact to LUE; No overnight events; pending therapies eval; all questions answered at this time.

## 2019-01-14 NOTE — DISCHARGE INSTRUCTIONS
Discharge Instructions    Discharged to home by car with relative. Discharged via wheelchair, hospital escort: Yes.  Special equipment needed: Not Applicable    Be sure to schedule a follow-up appointment with your primary care doctor or any specialists as instructed.     Discharge Plan:   Diet Plan: Discussed  Activity Level: Discussed  Confirmed Follow up Appointment: Patient to Call and Schedule Appointment  Confirmed Symptoms Management: Discussed  Medication Reconciliation Updated: Yes  Influenza Vaccine Indication: Not indicated: Previously immunized this influenza season and > 8 years of age    I understand that a diet low in cholesterol, fat, and sodium is recommended for good health. Unless I have been given specific instructions below for another diet, I accept this instruction as my diet prescription.   Other diet: Heart healthy     Special Instructions: None    · Is patient discharged on Warfarin / Coumadin?   No     Depression / Suicide Risk    As you are discharged from this [facility] facility, it is important to learn how to keep safe from harming yourself.    Recognize the warning signs:  · Abrupt changes in personality, positive or negative- including increase in energy   · Giving away possessions  · Change in eating patterns- significant weight changes-  positive or negative  · Change in sleeping patterns- unable to sleep or sleeping all the time   · Unwillingness or inability to communicate  · Depression  · Unusual sadness, discouragement and loneliness  · Talk of wanting to die  · Neglect of personal appearance   · Rebelliousness- reckless behavior  · Withdrawal from people/activities they love  · Confusion- inability to concentrate     If you or a loved one observes any of these behaviors or has concerns about self-harm, here's what you can do:  · Talk about it- your feelings and reasons for harming yourself  · Remove any means that you might use to hurt yourself (examples: pills, rope,  extension cords, firearm)  · Get professional help from the community (Mental Health, Substance Abuse, psychological counseling)  · Do not be alone:Call your Safe Contact- someone whom you trust who will be there for you.  · Call your local CRISIS HOTLINE 757-3436 or 546-116-9027  · Call your local Children's Mobile Crisis Response Team [place] (134) 093-3722 or www.Yolto  · Call the toll free National Suicide Prevention Hotlines   · National Suicide Prevention Lifeline 752-256-BJVB (3330)  · [employer] Line Network 800-SUICIDE (891-4219)      Shoulder Fracture  You have a fractured humerus (bone in the upper arm) at the shoulder just below the ball of the shoulder joint. Most of the time the bones of a broken shoulder are in an acceptable position. Usually the injury can be treated with a shoulder immobilizer or sling and swath bandage. These devices support the arm and prevent any shoulder movement. If the bones are not in a good position, then surgery is sometimes needed. Shoulder fractures usually cause swelling, pain, and discoloration around the upper arm initially. They heal in 8-12 weeks with proper treatment.  Rest in bed or a reclining chair as long as your shoulder is very painful. Sitting up generally results in less pain at the fracture site. Do not remove your shoulder bandage until your caregiver approves. You may apply ice packs over the shoulder for 20-30 minutes every 2 hours for the next 2-3 days to reduce the pain and swelling. Use your pain medicine as prescribed.   SEEK IMMEDIATE MEDICAL CARE IF:  · You develop severe shoulder pain unrelieved by rest and taking pain medicine.  · You have pain, numbness, tingling, or weakness in the hand or wrist.  · You develop shortness of breath, chest pain, severe weakness, or fainting.  · You have severe pain with motion of the fingers or wrist.  MAKE SURE YOU:   · Understand these instructions.  · Will watch your condition.  · Will get  "help right away if you are not doing well or get worse.     This information is not intended to replace advice given to you by your health care provider. Make sure you discuss any questions you have with your health care provider.     Document Released: 01/25/2006 Document Revised: 03/11/2013 Document Reviewed: 04/07/2010  [employer] Patient Education ©2016 [employer]        How to Use a Sling  Introduction  A sling is a type of hanging bandage that is worn around your neck to protect an injured arm, shoulder, or other body part. You may need to wear a sling to keep you from moving (immobilize) the injured body part while it heals. Keeping the injured part of your body still reduces pain and speeds up healing. Your health care provider may recommend using a sling if you have:  · A broken arm.  · A broken collarbone.  · A shoulder injury.  · Surgery.  What are the risks?  Wearing a sling the wrong way can:  · Make your injury worse.  · Cause stiffness or numbness.  · Affect blood circulation in your arm and hand. This can cause tingling or numbness in your fingers or hands.  How to use a sling  The way that you should use a sling depends on your injury. It is important that you follow all of your health care provider’s instructions for your injury. Also follow these general guidelines:  · Wear the sling so that your arm bends 90 degrees at the elbow. That is like a right angle or the shape of a capital letter \"L.\" The sling should also support your wrist and your hand.  · Try to avoid moving your arm.  · Do not lie down flat on your back while wearing a sling. Sleep in a recliner or use pillows to raise your upper body in bed.  · Do not twist, raise, or move your arm in a way that could make your injury worse.  · Do not lean on your arm while wearing a sling.  · Do not lift anything while wearing a sling.  Contact a health care provider if:  · You have bruising, swelling, or pain that is getting " worse.  · Your pain medicine is not helping.  · You have a fever.  Get help right away if:  · Your fingers are numb or tingling.  · Your fingers turn blue or feel cold to the touch.  · You cannot control the bleeding from your injury.  · You are short of breath.  This information is not intended to replace advice given to you by your health care provider. Make sure you discuss any questions you have with your health care provider.  Document Released: 08/01/2005 Document Revised: 05/25/2017 Document Reviewed: 10/21/2015  © 2017 [employer]

## 2021-01-13 DIAGNOSIS — Z23 NEED FOR VACCINATION: ICD-10-CM

## 2021-01-13 PROBLEM — M66.869: Status: ACTIVE | Noted: 2021-01-13

## 2024-07-18 NOTE — ED NOTES
Assumed care of patient. Pt assessed, AAO x 4 . Patient's concerns addressed.  Fall precautions in place.  Pt repositioned and comfortable.  Call light within reach. Will continue to monitor.     Quality 47: Advance Care Plan: Advance Care Planning discussed and documented; advance care plan or surrogate decision maker documented in the medical record. Detail Level: Detailed Quality 226: Preventive Care And Screening: Tobacco Use: Screening And Cessation Intervention: Patient screened for tobacco use and is an ex/non-smoker